# Patient Record
Sex: FEMALE | Race: WHITE | Employment: UNEMPLOYED
[De-identification: names, ages, dates, MRNs, and addresses within clinical notes are randomized per-mention and may not be internally consistent; named-entity substitution may affect disease eponyms.]

---

## 2023-02-09 ENCOUNTER — NURSE TRIAGE (OUTPATIENT)
Dept: OTHER | Facility: CLINIC | Age: 33
End: 2023-02-09

## 2023-02-10 NOTE — TELEPHONE ENCOUNTER
Location of patient: [place]    Subjective: Caller states \"Was in the epilepsy [place] was prescribed zonegram. Was told to call if any rashes developed. States her hands are red and blue with very minor tingling in fingers\"     Denies SOB, chest pain, dizziness    Current Symptoms: hand discoloration, head ache    Onset: 1 hours ago;       Pain Severity: 5/10; throbbing;     Temperature: Unsure       Pregnant: Yes    Recommended disposition:  call pcp now    Care advice provided, patient verbalizes understanding; denies any other questions or concerns; instructed to call back for any new or worsening symptoms. Caller agrees to  call provider    This triage is a result of a call to [address] [place] [address]. Please do not respond to the triage nurse through this encounter. Any subsequent communication should be directly with the patient.       Reason for Disposition   [1] Caller has URGENT medicine question about med that PCP or specialist prescribed AND [2] triager unable to answer question    Protocols used: Medication Question Call-ADULT-